# Patient Record
Sex: MALE | Race: WHITE | Employment: STUDENT | URBAN - METROPOLITAN AREA
[De-identification: names, ages, dates, MRNs, and addresses within clinical notes are randomized per-mention and may not be internally consistent; named-entity substitution may affect disease eponyms.]

---
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
## 2022-03-24 ENCOUNTER — NURSE ONLY (OUTPATIENT)
Dept: LAB | Age: 22
End: 2022-03-24
Payer: MEDICAID

## 2022-03-24 DIAGNOSIS — E05.00 GRAVES DISEASE: ICD-10-CM

## 2022-03-24 LAB
ANION GAP SERPL CALC-SCNC: 8 MMOL/L (ref 0–18)
BUN BLD-MCNC: 10 MG/DL (ref 7–18)
BUN/CREAT SERPL: 10.1 (ref 10–20)
CALCIUM BLD-MCNC: 9 MG/DL (ref 8.5–10.1)
CO2 SERPL-SCNC: 27 MMOL/L (ref 21–32)
CREAT BLD-MCNC: 0.99 MG/DL
GLUCOSE BLD-MCNC: 98 MG/DL (ref 70–99)
OSMOLALITY SERPL CALC.SUM OF ELEC: 291 MOSM/KG (ref 275–295)
POTASSIUM SERPL-SCNC: 3.9 MMOL/L (ref 3.5–5.1)
SODIUM SERPL-SCNC: 141 MMOL/L (ref 136–145)

## 2022-03-24 PROCEDURE — 93005 ELECTROCARDIOGRAM TRACING: CPT

## 2022-03-24 PROCEDURE — 36415 COLL VENOUS BLD VENIPUNCTURE: CPT

## 2022-03-24 PROCEDURE — 80048 BASIC METABOLIC PNL TOTAL CA: CPT

## 2022-03-24 PROCEDURE — 93010 ELECTROCARDIOGRAM REPORT: CPT | Performed by: INTERNAL MEDICINE

## 2022-03-25 ENCOUNTER — ANESTHESIA EVENT (OUTPATIENT)
Dept: SURGERY | Facility: HOSPITAL | Age: 22
End: 2022-03-25
Payer: MEDICAID

## 2022-03-26 ENCOUNTER — LAB ENCOUNTER (OUTPATIENT)
Dept: LAB | Age: 22
End: 2022-03-26
Attending: SURGERY
Payer: MEDICAID

## 2022-03-26 DIAGNOSIS — E05.00 GRAVES DISEASE: ICD-10-CM

## 2022-03-27 LAB — SARS-COV-2 RNA RESP QL NAA+PROBE: NOT DETECTED

## 2022-03-29 ENCOUNTER — ANESTHESIA (OUTPATIENT)
Dept: SURGERY | Facility: HOSPITAL | Age: 22
End: 2022-03-29
Payer: MEDICAID

## 2022-03-29 ENCOUNTER — HOSPITAL ENCOUNTER (OUTPATIENT)
Facility: HOSPITAL | Age: 22
Setting detail: HOSPITAL OUTPATIENT SURGERY
Discharge: HOME | End: 2022-03-29
Attending: SURGERY | Admitting: SURGERY
Payer: MEDICAID

## 2022-03-29 VITALS
WEIGHT: 183.38 LBS | BODY MASS INDEX: 30.55 KG/M2 | HEIGHT: 65 IN | TEMPERATURE: 98 F | OXYGEN SATURATION: 99 % | RESPIRATION RATE: 18 BRPM | DIASTOLIC BLOOD PRESSURE: 89 MMHG | SYSTOLIC BLOOD PRESSURE: 135 MMHG | HEART RATE: 68 BPM

## 2022-03-29 DIAGNOSIS — E05.00 GRAVES DISEASE: Primary | ICD-10-CM

## 2022-03-29 LAB
CALCIUM BLD-MCNC: 8.7 MG/DL (ref 8.5–10.1)
CALCIUM BLD-MCNC: 8.7 MG/DL (ref 8.5–10.1)
CREAT BLD-MCNC: 1.19 MG/DL
PHOSPHATE SERPL-MCNC: 3.6 MG/DL (ref 2.5–4.9)
PTH-INTACT SERPL-MCNC: 15.9 PG/ML (ref 18.5–88)

## 2022-03-29 PROCEDURE — 82310 ASSAY OF CALCIUM: CPT | Performed by: SURGERY

## 2022-03-29 PROCEDURE — 0GBJ0ZZ EXCISION OF THYROID GLAND ISTHMUS, OPEN APPROACH: ICD-10-PCS | Performed by: SURGERY

## 2022-03-29 PROCEDURE — 0GTK0ZZ RESECTION OF THYROID GLAND, OPEN APPROACH: ICD-10-PCS | Performed by: SURGERY

## 2022-03-29 PROCEDURE — 82565 ASSAY OF CREATININE: CPT | Performed by: SURGERY

## 2022-03-29 PROCEDURE — 83970 ASSAY OF PARATHORMONE: CPT | Performed by: SURGERY

## 2022-03-29 PROCEDURE — 88307 TISSUE EXAM BY PATHOLOGIST: CPT | Performed by: SURGERY

## 2022-03-29 PROCEDURE — 84100 ASSAY OF PHOSPHORUS: CPT | Performed by: SURGERY

## 2022-03-29 RX ORDER — ACETAMINOPHEN 500 MG
1000 TABLET ORAL ONCE AS NEEDED
Status: DISCONTINUED | OUTPATIENT
Start: 2022-03-29 | End: 2022-03-29

## 2022-03-29 RX ORDER — ONDANSETRON 2 MG/ML
INJECTION INTRAMUSCULAR; INTRAVENOUS AS NEEDED
Status: DISCONTINUED | OUTPATIENT
Start: 2022-03-29 | End: 2022-03-29 | Stop reason: SURG

## 2022-03-29 RX ORDER — TRAMADOL HYDROCHLORIDE 50 MG/1
TABLET ORAL EVERY 6 HOURS PRN
Qty: 20 TABLET | Refills: 0 | Status: SHIPPED | OUTPATIENT
Start: 2022-03-29

## 2022-03-29 RX ORDER — LIDOCAINE HYDROCHLORIDE 10 MG/ML
INJECTION, SOLUTION EPIDURAL; INFILTRATION; INTRACAUDAL; PERINEURAL AS NEEDED
Status: DISCONTINUED | OUTPATIENT
Start: 2022-03-29 | End: 2022-03-29 | Stop reason: SURG

## 2022-03-29 RX ORDER — HYDROCODONE BITARTRATE AND ACETAMINOPHEN 5; 325 MG/1; MG/1
1 TABLET ORAL EVERY 6 HOURS PRN
Qty: 10 TABLET | Refills: 0 | Status: SHIPPED | OUTPATIENT
Start: 2022-03-29

## 2022-03-29 RX ORDER — DEXAMETHASONE SODIUM PHOSPHATE 4 MG/ML
VIAL (ML) INJECTION AS NEEDED
Status: DISCONTINUED | OUTPATIENT
Start: 2022-03-29 | End: 2022-03-29 | Stop reason: SURG

## 2022-03-29 RX ORDER — DIPHENHYDRAMINE HYDROCHLORIDE 50 MG/ML
12.5 INJECTION INTRAMUSCULAR; INTRAVENOUS AS NEEDED
Status: DISCONTINUED | OUTPATIENT
Start: 2022-03-29 | End: 2022-03-29

## 2022-03-29 RX ORDER — ACETAMINOPHEN 500 MG
1000 TABLET ORAL ONCE
Status: COMPLETED | OUTPATIENT
Start: 2022-03-29 | End: 2022-03-29

## 2022-03-29 RX ORDER — HYDROCODONE BITARTRATE AND ACETAMINOPHEN 5; 325 MG/1; MG/1
2 TABLET ORAL AS NEEDED
Status: COMPLETED | OUTPATIENT
Start: 2022-03-29 | End: 2022-03-29

## 2022-03-29 RX ORDER — SODIUM CHLORIDE, SODIUM LACTATE, POTASSIUM CHLORIDE, CALCIUM CHLORIDE 600; 310; 30; 20 MG/100ML; MG/100ML; MG/100ML; MG/100ML
INJECTION, SOLUTION INTRAVENOUS CONTINUOUS
Status: DISCONTINUED | OUTPATIENT
Start: 2022-03-29 | End: 2022-03-29

## 2022-03-29 RX ORDER — MEPERIDINE HYDROCHLORIDE 25 MG/ML
12.5 INJECTION INTRAMUSCULAR; INTRAVENOUS; SUBCUTANEOUS AS NEEDED
Status: DISCONTINUED | OUTPATIENT
Start: 2022-03-29 | End: 2022-03-29

## 2022-03-29 RX ORDER — ONDANSETRON 2 MG/ML
4 INJECTION INTRAMUSCULAR; INTRAVENOUS AS NEEDED
Status: DISCONTINUED | OUTPATIENT
Start: 2022-03-29 | End: 2022-03-29

## 2022-03-29 RX ORDER — ACETAMINOPHEN 325 MG/1
650 TABLET ORAL ONCE
Status: DISCONTINUED | OUTPATIENT
Start: 2022-03-29 | End: 2022-03-29

## 2022-03-29 RX ORDER — BUPIVACAINE HYDROCHLORIDE 5 MG/ML
INJECTION, SOLUTION EPIDURAL; INTRACAUDAL AS NEEDED
Status: DISCONTINUED | OUTPATIENT
Start: 2022-03-29 | End: 2022-03-29 | Stop reason: HOSPADM

## 2022-03-29 RX ORDER — LABETALOL HYDROCHLORIDE 5 MG/ML
5 INJECTION, SOLUTION INTRAVENOUS EVERY 5 MIN PRN
Status: DISCONTINUED | OUTPATIENT
Start: 2022-03-29 | End: 2022-03-29

## 2022-03-29 RX ORDER — ROCURONIUM BROMIDE 10 MG/ML
INJECTION, SOLUTION INTRAVENOUS AS NEEDED
Status: DISCONTINUED | OUTPATIENT
Start: 2022-03-29 | End: 2022-03-29 | Stop reason: SURG

## 2022-03-29 RX ORDER — HYDROMORPHONE HYDROCHLORIDE 1 MG/ML
0.4 INJECTION, SOLUTION INTRAMUSCULAR; INTRAVENOUS; SUBCUTANEOUS EVERY 5 MIN PRN
Status: DISCONTINUED | OUTPATIENT
Start: 2022-03-29 | End: 2022-03-29

## 2022-03-29 RX ORDER — ACETAMINOPHEN 500 MG
TABLET ORAL
Status: COMPLETED
Start: 2022-03-29 | End: 2022-03-29

## 2022-03-29 RX ORDER — NALOXONE HYDROCHLORIDE 0.4 MG/ML
80 INJECTION, SOLUTION INTRAMUSCULAR; INTRAVENOUS; SUBCUTANEOUS AS NEEDED
Status: DISCONTINUED | OUTPATIENT
Start: 2022-03-29 | End: 2022-03-29

## 2022-03-29 RX ORDER — METOCLOPRAMIDE HYDROCHLORIDE 5 MG/ML
10 INJECTION INTRAMUSCULAR; INTRAVENOUS AS NEEDED
Status: DISCONTINUED | OUTPATIENT
Start: 2022-03-29 | End: 2022-03-29

## 2022-03-29 RX ORDER — LIDOCAINE HYDROCHLORIDE 40 MG/ML
SOLUTION TOPICAL AS NEEDED
Status: DISCONTINUED | OUTPATIENT
Start: 2022-03-29 | End: 2022-03-29 | Stop reason: SURG

## 2022-03-29 RX ORDER — HYDROCODONE BITARTRATE AND ACETAMINOPHEN 5; 325 MG/1; MG/1
1 TABLET ORAL AS NEEDED
Status: COMPLETED | OUTPATIENT
Start: 2022-03-29 | End: 2022-03-29

## 2022-03-29 RX ADMIN — LIDOCAINE HYDROCHLORIDE 2 ML: 40 SOLUTION TOPICAL at 10:41:00

## 2022-03-29 RX ADMIN — DEXAMETHASONE SODIUM PHOSPHATE 8 MG: 4 MG/ML VIAL (ML) INJECTION at 10:54:00

## 2022-03-29 RX ADMIN — ROCURONIUM BROMIDE 10 MG: 10 INJECTION, SOLUTION INTRAVENOUS at 10:41:00

## 2022-03-29 RX ADMIN — LIDOCAINE HYDROCHLORIDE 100 MG: 10 INJECTION, SOLUTION EPIDURAL; INFILTRATION; INTRACAUDAL; PERINEURAL at 10:41:00

## 2022-03-29 RX ADMIN — ONDANSETRON 4 MG: 2 INJECTION INTRAMUSCULAR; INTRAVENOUS at 11:04:00

## 2022-03-29 RX ADMIN — SODIUM CHLORIDE, SODIUM LACTATE, POTASSIUM CHLORIDE, CALCIUM CHLORIDE: 600; 310; 30; 20 INJECTION, SOLUTION INTRAVENOUS at 10:37:00

## 2022-03-29 NOTE — HISTORICAL OFFICE NOTE
The above referenced H&P in the office on 3/21/2022 by Dr. Alberta Barthel was reviewed by Seb Toledo MD on 3/29/2022, the patient was examined and no significant changes have occurred in the patient's condition since the H&P was performed. Risks and benefits were discussed, proceed with procedure as planned.

## 2022-03-29 NOTE — BRIEF OP NOTE
Pre-Operative Diagnosis: Graves disease [E05.00]     Post-Operative Diagnosis: Graves disease [E05.00]      Procedure Performed:   TOTAL THYROIDECTOMY WITH NERVE MONITORING    Surgeon(s) and Role:     Luanne Casper MD - Primary    Assistant(s):  Surgical Assistant.: BURAK Peoples     Surgical Findings: no nodules     Specimen: total thyroid gland     Estimated Blood Loss: Blood Output: 5 mL (3/29/2022 11:54 AM)    Dictation Number:  Felisa Mccracken MD  3/29/2022  11:56 AM

## 2022-03-29 NOTE — ANESTHESIA PROCEDURE NOTES
Airway  Date/Time: 3/29/2022 10:41 AM  Urgency: elective    Airway not difficult    General Information and Staff    Patient location during procedure: OR  Anesthesiologist: Rocio Crespo MD  Performed: anesthesiologist     Indications and Patient Condition  Indications for airway management: anesthesia  Spontaneous Ventilation: absent  Sedation level: deep  Preoxygenated: yes  Patient position: sniffing  Mask difficulty assessment: 1 - vent by mask    Final Airway Details  Final airway type: endotracheal airway      Successful airway: ETT and NIM tube  Cuffed: yes   Successful intubation technique: Video laryngoscopy  Facilitating devices/methods: intubating stylet  Endotracheal tube insertion site: oral  Blade: GlideScope  Blade size: #3  ETT size (mm): 6.0    Cormack-Lehane Classification: grade I - full view of glottis  Placement verified by: chest auscultation and capnometry   Cuff volume (mL): 9  Measured from: lips  ETT to lips (cm): 22  Number of attempts at approach: 1  Number of other approaches attempted: 0

## 2022-03-29 NOTE — ANESTHESIA POSTPROCEDURE EVALUATION
[address] [facility] Patient Status:  Hospital Outpatient Surgery   Age/Gender 25year old male MRN [id number]   Location [address] [facility] Attending Vero Forte MD   Hosp Day # 0 PCP Delicia Manley MD       Anesthesia Post-op Note    TOTAL THYROIDECTOMY WITH NERVE MONITORING    Procedure Summary     Date: 03/29/22 Room / Location: [facility] MAIN OR 03 / [facility] MAIN OR    Anesthesia Start: 7406 Anesthesia Stop: 1561    Procedure: TOTAL THYROIDECTOMY WITH NERVE MONITORING (N/A Neck) Diagnosis:       Graves disease      (Graves disease [E05.00])    Surgeons: Vero Forte MD Anesthesiologist: Vance Del Cid MD    Anesthesia Type: general ASA Status: 2          Anesthesia Type: general    Vitals Value Taken Time   /94 03/29/22 1222   Temp 98.1 03/29/22 1225   Pulse 83 03/29/22 1223   Resp 16 03/29/22 1223   SpO2 99 % 03/29/22 1223   Vitals shown include unvalidated device data. Patient Location: PACU    Anesthesia Type: general    Airway Patency: patent and extubated    Postop Pain Control: adequate    Mental Status: mildly sedated but able to meaningfully participate in the post-anesthesia evaluation    Nausea/Vomiting: none    Cardiopulmonary/Hydration status: stable euvolemic    Complications: no apparent anesthesia related complications    Postop vital signs: stable    Dental Exam: Unchanged from Preop    Patient to be discharged from PACU when criteria met.

## 2022-03-30 NOTE — OPERATIVE REPORT
[facility]    PATIENT'S NAME: Roxane Kline   ATTENDING PHYSICIAN: Jun Dukes M.D. OPERATING PHYSICIAN: Jun Dukes M.D. PATIENT ACCOUNT#:   [de-identified]    LOCATION:  [address] [place] [address] 10 [address]  MEDICAL RECORD #:   [id number]       YOB: 2000  ADMISSION DATE:       03/29/2022      OPERATION DATE:  03/29/2022    OPERATIVE REPORT      PREOPERATIVE DIAGNOSIS:  Graves disease. POSTOPERATIVE DIAGNOSIS:  Graves disease. PROCEDURE:  Total thyroidectomy with bilateral neuromonitoring. ASSISTANT:  BURAK Hacktet     ANESTHESIA:  General.    ESTIMATED BLOOD LOSS:  5 mL. SPECIMEN:  Total thyroid gland. DISPOSITION:  To PACU. COMPLICATIONS:  None. INDICATIONS:  The patient is a 79-year-old male who was diagnosed with Graves disease in 2015. He was seen at [facility] and was placed on methimazole to which he developed a rash, so then changed to PTU. Currently, he is on PTU with propranolol which controls the symptoms. However, he noticed some mild dysphagia and breathing troubles. Due to his bulging eyes, the endocrinologist recommended surgery rather than radioactive iodine ablation. So he is here for the total thyroidectomy. The risks and benefits of the surgery were discussed, and the patient agreed for the procedure, signed the informed consent. OPERATIVE TECHNIQUE:  Patient was brought to the operating room, was placed in supine position on the operating table. Lower extremity  SCD boots were placed. After IV sedation and the neuromonitoring and endotracheal tube placement, both arms tucked at the side and roll was placed under shoulder blade, neck was slightly hyperextended. Then, the area was prepped and draped into a sterile field. Lower Kocher neck incision was made with a 15-blade knife. Electric Bovie cautery was used to separate the subcutaneous tissue and the superior and inferior flaps were raised.   Deep cervical fascia was identified, midline incision was made, and then the left strap muscle was carefully dissected from the thyroid capsule. Using the Harmonic scalpel, the superior and inferior poles were divided, as well as middle thyroid vein. As the gland was medially rotated, both parathyroid glands were identified and preserved, and nerve and artery identified. The artery was divided and the nerve had a good signal on the monitor, then released from the ligament of Berry and pretracheal fascia. Then, the attention was turned to the right side. After releasing the superior and inferior poles, middle thyroid vein was identified. Then, the gland was medially rotated, both parathyroid glands were identified and preserved, and the nerve and artery identified. The artery was divided and the nerve had a good signal on the monitor, then released from the remaining attachments and the thyroid gland was removed off the field. All the blood vessels that were divided using the Harmonic scalpel were reinforced using 2-0, 3-0 silk ties. After obtaining good hemostasis, a piece of SNoW was placed due to the large void space after the removal of the thyroid gland. Then, 3-0 Vicryl was used to close the deep cervical dermal layer. Local anesthetic was injected into the surrounding tissue, 4-0 Monocryl for the skin placing a subcuticular stitch. Incision was then covered with Steri-Strips, 4 x 4, and tape. Patient tolerated the procedure well, was extubated in the operating room, and transferred to PACU in stable condition. At the end of the case, the needle, instrument, and sponge counts were all correct. The surgical assistant was medically necessary to perform the procedure, where she positioned the patient, as well as passed instruments, exposed the open wound, retracted the skin to remove the thyroid, and at the end to close the skin and transfer the patient out of the operating room.        Dictated By Earl Cornejo M.D.  d: 03/29/2022 11:57:42  t: 03/29/2022 21:15:09  [facility] [id number]  JAVIER/

## 2022-04-05 NOTE — PROGRESS NOTES
Magalis, please notify patient with final pathology showed no cancer  Levothyroxine per Dr. Latisha Huntley

## (undated) DEVICE — STERILE SYNTHETIC POLYISOPRENE POWDER-FREE SURGICAL GLOVES WITH HYDROGEL COATING: Brand: PROTEXIS

## (undated) DEVICE — 3M(TM) MICROPORE TAPE DISPENSER 1535-2: Brand: 3M™ MICROPORE™

## (undated) DEVICE — MEGADYNE ELECTRODE ADULT PT RT

## (undated) DEVICE — EMG TUBE 8229706 NIM TRIVANTAGE 6.0MM ID: Brand: NIM TRIVANTAGE™

## (undated) DEVICE — THYROID: Brand: MEDLINE INDUSTRIES, INC.

## (undated) DEVICE — PROBE 8225101 5PK STD PRASS FL TIP ROHS

## (undated) DEVICE — SURGICEL SNOW ABS 4X4

## (undated) DEVICE — SUTURE VICRYL 3-0 SH

## (undated) DEVICE — SUTURE MONOCRYL 4-0 PS-2

## (undated) DEVICE — SUTURE SILK 3-0

## (undated) DEVICE — SCD SLEEVE KNEE HI BLEND

## (undated) DEVICE — HARMONIC 1100 SHEARS, 20CM SHAFT LENGTH: Brand: HARMONIC

## (undated) DEVICE — 3M™ STERI-STRIP™ REINFORCED ADHESIVE SKIN CLOSURES, R1547, 1/2 IN X 4 IN (12 MM X 100 MM), 6 STRIPS/ENVELOPE: Brand: 3M™ STERI-STRIP™

## (undated) DEVICE — ABSORBABLE HEMOSTAT (OXIDIZED REGENERATED CELLULOSE, U.S.P.): Brand: SURGICEL

## (undated) DEVICE — SOL  .9 1000ML BTL

## (undated) DEVICE — SUTURE SILK 2-0